# Patient Record
Sex: FEMALE | URBAN - METROPOLITAN AREA
[De-identification: names, ages, dates, MRNs, and addresses within clinical notes are randomized per-mention and may not be internally consistent; named-entity substitution may affect disease eponyms.]

---

## 2019-03-29 ENCOUNTER — Encounter (OUTPATIENT)
Dept: URBAN - METROPOLITAN AREA CLINIC 72 | Facility: CLINIC | Age: 59
End: 2019-03-29
Payer: COMMERCIAL

## 2019-03-29 PROCEDURE — 92015 DETERMINE REFRACTIVE STATE: CPT | Performed by: OPTOMETRIST

## 2019-03-29 PROCEDURE — 92004 COMPRE OPH EXAM NEW PT 1/>: CPT | Performed by: OPTOMETRIST

## 2020-11-02 ENCOUNTER — OFFICE VISIT (OUTPATIENT)
Dept: URBAN - METROPOLITAN AREA CLINIC 71 | Facility: CLINIC | Age: 60
End: 2020-11-02
Payer: COMMERCIAL

## 2020-11-02 DIAGNOSIS — H11.423 CONJUNCTIVAL EDEMA, BILATERAL: Primary | ICD-10-CM

## 2020-11-02 PROCEDURE — 92002 INTRM OPH EXAM NEW PATIENT: CPT | Performed by: OPHTHALMOLOGY

## 2020-11-02 RX ORDER — PREDNISOLONE ACETATE 10 MG/ML
1 % SUSPENSION/ DROPS OPHTHALMIC
Qty: 1 | Refills: 0 | Status: ACTIVE
Start: 2020-11-02

## 2020-11-02 ASSESSMENT — KERATOMETRY
OD: 44.38
OS: 44.38

## 2020-11-02 ASSESSMENT — INTRAOCULAR PRESSURE
OS: 18
OD: 17

## 2020-11-02 NOTE — IMPRESSION/PLAN
Impression: Conjunctival edema, bilateral: H11.423. OD worse than OS. Chemosis. Plan: Discussed diagnosis with patient. Likely related to surgery patient recently had done. Informed patient this is not dangerous and will resolve on it's own as it's healing. It may swell up occasionally until healed. Patient okay to start back on lotemax QD OS and BID OD to help inflammation. Okay to use drop until bottle is empty. Will also send in bottle of pred acetate if lotemax runs out before inflammation is resolved. If no improvement is found in 3-4 weeks recommend patient to contact surgeon.